# Patient Record
(demographics unavailable — no encounter records)

---

## 2024-12-12 NOTE — HISTORY OF PRESENT ILLNESS
[FreeTextEntry1] : RAHUL ASHBY is a 66 year M with PMHx of MVR, HTN, hx of elevated PSA presenting for ED on 09/21/2023  He reports for the last 3 years difficulty with erections 3/10. PDE5i naive, endorses morning time and nocturnal erections.  - on tadalafil 5 mg - on alfuzosin and endorses facilitates urination but has poor flow,  - nocturia x 1  - consider procedural vs finasteride  - concern regarding loss of ejaculate  - feels the urination can be almost painful.   He denies forget fullness, low libido daytime frequency, hesitancy dysuria hematuria flank pain  Last PSA: 7/2023 10.4 - two previous biopsy, last 10 years ago.  MRI prostate 11/26/2024 134 g gland PIRADS 1

## 2024-12-12 NOTE — ASSESSMENT
[FreeTextEntry1] : RAHUL ASHBY is a 66 year M with PMHx of MVR, HTN, hx of elevated PSA presenting for ED on 09/21/2023  BPH with elevated PSA  - UA UCx PSA today  - PRV to r/o retention 11cc - continue alfuzosin and daily Cialis  - trial finasteride   ED  - up to 20 mg PRN tadalafil

## 2024-12-12 NOTE — END OF VISIT
[FreeTextEntry3] : I, Dr. Coats, personally performed the evaluation and management (E/M) services for this established patient who presents today with (a) new problem(s)/exacerbation of (an) existing condition(s). That E/M includes conducting the clinically appropriate interval history &/or exam, assessing all new/exacerbated conditions, and establishing a new plan of care. Today, my BONIFACIO, Zachary Lopes, was here to observe my evaluation and management service for this new problem/exacerbated condition and follow the plan of care established by me going forward

## 2024-12-12 NOTE — PHYSICAL EXAM
[Normal Appearance] : normal appearance [General Appearance - In No Acute Distress] : no acute distress [Edema] : no peripheral edema [Abdomen Soft] : soft [Abdomen Tenderness] : non-tender [Normal Station and Gait] : the gait and station were normal for the patient's age [Oriented To Time, Place, And Person] : oriented to person, place, and time [Affect] : the affect was normal

## 2025-06-18 NOTE — HISTORY OF PRESENT ILLNESS
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  Patient presents today for repeat echo and follow up visit with Dr. Talamantes. He appears well, NAD. He reports remaining physically active with aerobic exercise with no limitation. He denies c/o chest pain, sob/reveles, fatigue, lower extremity edema, orthopnea, PND, syncope or palpitations. NYHA 1.

## 2025-06-18 NOTE — ASSESSMENT
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 Dr. Talamantes reviewed the echocardiogram images and results with the patient. He recommends that if patient remains asymptomatic with no change in physical activity then return in 1 year with repeat TTE. If patient were to develop symptoms of sob/reveles, fatigue, decreased exercise tolerance,  pnd, orthopnea or lower extremity edema then he should return sooner for TTE.   Plan:  RTC in 1 year w/TTE follow up with Dr. Palmer continue antibiotic prophylaxis prior to dental and surgical procedures.

## 2025-06-18 NOTE — ASSESSMENT
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 Dr. Talamantes reviewed the echocardiogram images and results with the patient. He recommends that if patient remains asymptomatic with no change in physical activity then return in 1 year with repeat TTE. If patient were to develop symptoms of sob/reveles, fatigue, decreased exercise tolerance,  pnd, orthopnea or lower extremity edema then he should return sooner for TTE.   Plan:  RTC in 1 year w/TTE follow up with Dr. Palmer continue antibiotic prophylaxis prior to dental and surgical procedures.

## 2025-06-18 NOTE — PHYSICAL EXAM
[Normal Appearance] : normal appearance [General Appearance - In No Acute Distress] : no acute distress [Edema] : no peripheral edema [] : no respiratory distress [Exaggerated Use Of Accessory Muscles For Inspiration] : no accessory muscle use [Abdomen Soft] : soft [Abdomen Tenderness] : non-tender [Normal Station and Gait] : the gait and station were normal for the patient's age [Skin Color & Pigmentation] : normal skin color and pigmentation [Oriented To Time, Place, And Person] : oriented to person, place, and time [Affect] : the affect was normal

## 2025-06-18 NOTE — PHYSICAL EXAM
[Sclera] : the sclera and conjunctiva were normal [PERRL With Normal Accommodation] : pupils were equal in size, round, and reactive to light [Extraocular Movements] : extraocular movements were intact [Neck Appearance] : the appearance of the neck was normal [Neck Cervical Mass (___cm)] : no neck mass was observed [Jugular Venous Distention Increased] : there was no jugular-venous distention [Thyroid Diffuse Enlargement] : the thyroid was not enlarged [Thyroid Nodule] : there were no palpable thyroid nodules [Auscultation Breath Sounds / Voice Sounds] : lungs were clear to auscultation bilaterally [Heart Rate And Rhythm] : heart rate was normal and rhythm regular [2+] : left 2+ [No Abnormalities] : the abdominal aorta was not enlarged and no bruit was heard [Bowel Sounds] : normal bowel sounds [Abdomen Soft] : soft [Abdomen Tenderness] : non-tender [Abdomen Mass (___ Cm)] : no abdominal mass palpated [No CVA Tenderness] : no ~M costovertebral angle tenderness [No Spinal Tenderness] : no spinal tenderness [Nail Clubbing] : no clubbing  or cyanosis of the fingernails [Abnormal Walk] : normal gait [Musculoskeletal - Swelling] : no joint swelling seen [Motor Tone] : muscle strength and tone were normal [Skin Color & Pigmentation] : normal skin color and pigmentation [] : no rash [Skin Turgor] : normal skin turgor [Deep Tendon Reflexes (DTR)] : deep tendon reflexes were 2+ and symmetric [Sensation] : the sensory exam was normal to light touch and pinprick [No Focal Deficits] : no focal deficits [Oriented To Time, Place, And Person] : oriented to person, place, and time [Impaired Insight] : insight and judgment were intact [Affect] : the affect was normal [FreeTextEntry1] : right ant mini thoracotomy incision well healed [Right Carotid Bruit] : no bruit heard over the right carotid [Left Carotid Bruit] : no bruit heard over the left carotid [Right Femoral Bruit] : no bruit heard over the right femoral artery [Left Femoral Bruit] : no bruit heard over the left femoral artery

## 2025-06-18 NOTE — PHYSICAL EXAM
[Sclera] : the sclera and conjunctiva were normal [PERRL With Normal Accommodation] : pupils were equal in size, round, and reactive to light [Extraocular Movements] : extraocular movements were intact [Neck Appearance] : the appearance of the neck was normal [Neck Cervical Mass (___cm)] : no neck mass was observed [Jugular Venous Distention Increased] : there was no jugular-venous distention [Thyroid Diffuse Enlargement] : the thyroid was not enlarged [Thyroid Nodule] : there were no palpable thyroid nodules [Auscultation Breath Sounds / Voice Sounds] : lungs were clear to auscultation bilaterally [Heart Rate And Rhythm] : heart rate was normal and rhythm regular [2+] : left 2+ [No Abnormalities] : the abdominal aorta was not enlarged and no bruit was heard [Bowel Sounds] : normal bowel sounds [Abdomen Soft] : soft [Abdomen Tenderness] : non-tender [Abdomen Mass (___ Cm)] : no abdominal mass palpated [No CVA Tenderness] : no ~M costovertebral angle tenderness [No Spinal Tenderness] : no spinal tenderness [Abnormal Walk] : normal gait [Nail Clubbing] : no clubbing  or cyanosis of the fingernails [Musculoskeletal - Swelling] : no joint swelling seen [Motor Tone] : muscle strength and tone were normal [Skin Color & Pigmentation] : normal skin color and pigmentation [] : no rash [Skin Turgor] : normal skin turgor [Deep Tendon Reflexes (DTR)] : deep tendon reflexes were 2+ and symmetric [Sensation] : the sensory exam was normal to light touch and pinprick [No Focal Deficits] : no focal deficits [Oriented To Time, Place, And Person] : oriented to person, place, and time [Impaired Insight] : insight and judgment were intact [Affect] : the affect was normal [FreeTextEntry1] : right ant mini thoracotomy incision well healed [Right Carotid Bruit] : no bruit heard over the right carotid [Left Carotid Bruit] : no bruit heard over the left carotid [Right Femoral Bruit] : no bruit heard over the right femoral artery [Left Femoral Bruit] : no bruit heard over the left femoral artery

## 2025-06-18 NOTE — HISTORY OF PRESENT ILLNESS
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  Patient presents today for repeat echo and follow up visit with Dr. Talamantes. He appears well, NAD. He reports remaining physically active with aerobic exercise with no limitation. He denies c/o chest pain, sob/reveles, fatigue, lower extremity edema, orthopnea, PND, syncope or palpitations. NYHA 1.

## 2025-06-18 NOTE — DATA REVIEWED
[FreeTextEntry1] : 6/17/25 TTE: 1. Left ventricular cavity is normal in size. Left ventricular wall thickness is normal. Left ventricular systolic function is normal with an ejection fraction of 64 % by London's method of disks. There are no regional wall motion abnormalities seen. 2. Normal right ventricular cavity size and normal right ventricular systolic function. 3. Biatrial dilatation. 4. An annuloplasty ring is noted in the mitral position. 5. Prolapse of the posterior mitral leaflet. 6. Moderate to severe mitral regurgitation. 7. No echocardiographic evidence of pulmonary hypertension. 8. Compared to the transthoracic echocardiogram performed on 9/5/2024, findings are similar.  9/5/24 TTE: 1. Normal left ventricular size. 2. Mild symmetric left ventricular hypertrophy. 3. Normal left ventricular systolic function. 4. Normal right ventricular size and systolic function. 5. Dilated left atrium. 6. There is mitral valve prolapse of the posterior leaflet. An annuloplasty ring is noted in the mitral position. There is moderate-to-severe mitral regurgitation. 7. Bicuspid aortic valve. 8. Mild aortic regurgitation. 9. Pulmonary artery systolic pressure is 28 mmHg. 10. No pericardial effusion. 11. Compared to the previous TTE performed on 4/2/2024, there have been no significant interval changes.  4/2/24 TTE: 1. Normal left and right ventricular size and systolic function. 2. Moderately dilated left atrium. 3. Bicuspid aortic valve with fusion of left and right cusps. No significant stenosis. 4. Posterior mitral valve prolapse with moderate-to-severe eccentric mitral regurgitation. 5. An annuloplasty ring is noted in the mitral position. The mean transvalvular gradient is 2.70 mmHg at a heart rate of 70 bpm. 6. Pulmonary artery systolic pressure is 28 mmHg. 7. No pericardial effusion. 8. Compared to the previous TTE performed on 10/27/2023, there is new prolapse of posterior leaflet resulting in recrudescence of mitral regurgitation. Right and left ventricular function is now normal.  1/8/24 TTE: 1. Left ventricular cavity is normal. Septal motion is abnormal consistent with previous cardiac surgery. Left ventricular systolic function is borderline normal with an ejection fraction visually estimated at 50 %. 2. Mild left ventricular hypertrophy. 3. There is severe (grade 3) left ventricular diastolic dysfunction. 4. Normal right ventricular cavity size and borderline reduced systolic function. 5. An annuloplasty ring is noted in the mitral position with moderate to severe intravalvular regurgitation. Myxomatous appearance and prolapse of the posterior mitral valve leaflet. Anterior directed regurgitant jet. Trans MV mean gradient is 3.0 mmHg. 6. Mild to moderately calcified bicuspid aortic valve without stenosis and mild aortic regurgitation. 7. No echocardiographic evidence of pulmonary hypertension. 8. No pericardial effusion seen. 9. Compared to the transthoracic echocardiogram performed on 10/5/2023 , the mitral valve has been repaired. The RV systolic pressure decreased from 70 mmHg to 30 mmHg.

## 2025-06-18 NOTE — PHYSICAL EXAM
[Sclera] : the sclera and conjunctiva were normal [PERRL With Normal Accommodation] : pupils were equal in size, round, and reactive to light [Neck Appearance] : the appearance of the neck was normal [Extraocular Movements] : extraocular movements were intact [Neck Cervical Mass (___cm)] : no neck mass was observed [Jugular Venous Distention Increased] : there was no jugular-venous distention [Thyroid Diffuse Enlargement] : the thyroid was not enlarged [Thyroid Nodule] : there were no palpable thyroid nodules [Auscultation Breath Sounds / Voice Sounds] : lungs were clear to auscultation bilaterally [Heart Rate And Rhythm] : heart rate was normal and rhythm regular [2+] : left 2+ [No Abnormalities] : the abdominal aorta was not enlarged and no bruit was heard [Abdomen Soft] : soft [Bowel Sounds] : normal bowel sounds [Abdomen Tenderness] : non-tender [Abdomen Mass (___ Cm)] : no abdominal mass palpated [No CVA Tenderness] : no ~M costovertebral angle tenderness [No Spinal Tenderness] : no spinal tenderness [Abnormal Walk] : normal gait [Nail Clubbing] : no clubbing  or cyanosis of the fingernails [Musculoskeletal - Swelling] : no joint swelling seen [Motor Tone] : muscle strength and tone were normal [Skin Color & Pigmentation] : normal skin color and pigmentation [] : no rash [Skin Turgor] : normal skin turgor [Deep Tendon Reflexes (DTR)] : deep tendon reflexes were 2+ and symmetric [Sensation] : the sensory exam was normal to light touch and pinprick [No Focal Deficits] : no focal deficits [Oriented To Time, Place, And Person] : oriented to person, place, and time [Impaired Insight] : insight and judgment were intact [Affect] : the affect was normal [FreeTextEntry1] : right ant mini thoracotomy incision well healed [Right Carotid Bruit] : no bruit heard over the right carotid [Left Carotid Bruit] : no bruit heard over the left carotid [Right Femoral Bruit] : no bruit heard over the right femoral artery [Left Femoral Bruit] : no bruit heard over the left femoral artery